# Patient Record
Sex: MALE
[De-identification: names, ages, dates, MRNs, and addresses within clinical notes are randomized per-mention and may not be internally consistent; named-entity substitution may affect disease eponyms.]

---

## 2022-05-17 ENCOUNTER — APPOINTMENT (OUTPATIENT)
Dept: ORTHOPEDIC SURGERY | Facility: CLINIC | Age: 67
End: 2022-05-17
Payer: MEDICARE

## 2022-05-17 VITALS — HEIGHT: 75 IN | WEIGHT: 240 LBS | BODY MASS INDEX: 29.84 KG/M2

## 2022-05-17 DIAGNOSIS — I10 ESSENTIAL (PRIMARY) HYPERTENSION: ICD-10-CM

## 2022-05-17 DIAGNOSIS — E11.9 TYPE 2 DIABETES MELLITUS W/OUT COMPLICATIONS: ICD-10-CM

## 2022-05-17 PROBLEM — Z00.00 ENCOUNTER FOR PREVENTIVE HEALTH EXAMINATION: Status: ACTIVE | Noted: 2022-05-17

## 2022-05-17 PROCEDURE — 73562 X-RAY EXAM OF KNEE 3: CPT | Mod: RT

## 2022-05-17 PROCEDURE — 99204 OFFICE O/P NEW MOD 45 MIN: CPT

## 2022-05-17 NOTE — DISCUSSION/SUMMARY
[de-identified] : The patient was advised of the diagnosis.  The natural history of the pathology was explained in full to the patient in layman's terms. All questions were answered.  The risks and benefits of surgical and non-surgical treatment alternatives were explained in full to the patient. \par Progress note completed by Meredith Ba PA-C.

## 2022-05-17 NOTE — HISTORY OF PRESENT ILLNESS
[8] : 8 [5] : 5 [Dull/Aching] : dull/aching [Localized] : localized [Intermittent] : intermittent [Sitting] : sitting [Stairs] : stairs [Full time] : Work status: full time [de-identified] : 5/17/22: 66yo M with right anterior knee pain for the past few months with no specific injury. Denies prior knee issues. States it initially bothered him when he was working from home at his desk. It became most painful when sitting for prolonged periods and it had improved with walking. Over the past few weeks it has become bothersome with descending stairs.  [] : Post Surgical Visit: no [FreeTextEntry1] : Right knee [FreeTextEntry5] : patient is here with knee pain for about a few months \par patient complains of aching pain after sitting for long periods of time

## 2022-05-17 NOTE — IMAGING
[Right] : right knee [There are no fractures, subluxations or dislocations. No significant abnormalities are seen] : There are no fractures, subluxations or dislocations. No significant abnormalities are seen [de-identified] : Right knee\par Minimal anterior tenderness\par ROM 0-125\par Ambulation w/o assistance \par

## 2022-05-17 NOTE — ASSESSMENT
[FreeTextEntry1] : 5/17/22: Minimal knee OA. No significant findings on exam. Unsure if chondromalacia patellae vs. quad tendonitis. Discussed anti-inflammatories, PT/HEP, CSI, and visco series. Mobic and PT rx. f/up in 6-8 weeks; consider MRI

## 2022-07-12 ENCOUNTER — APPOINTMENT (OUTPATIENT)
Dept: ORTHOPEDIC SURGERY | Facility: CLINIC | Age: 67
End: 2022-07-12

## 2022-07-12 VITALS — BODY MASS INDEX: 29.84 KG/M2 | WEIGHT: 240 LBS | HEIGHT: 75 IN

## 2022-07-12 DIAGNOSIS — M94.261 CHONDROMALACIA, RIGHT KNEE: ICD-10-CM

## 2022-07-12 DIAGNOSIS — M25.561 PAIN IN RIGHT KNEE: ICD-10-CM

## 2022-07-12 PROCEDURE — 99213 OFFICE O/P EST LOW 20 MIN: CPT

## 2022-07-12 NOTE — IMAGING
[Right] : right knee [There are no fractures, subluxations or dislocations. No significant abnormalities are seen] : There are no fractures, subluxations or dislocations. No significant abnormalities are seen [de-identified] : Right knee\par Minimal anterior tenderness\par ROM 0-125\par Ambulation w/o assistance \par

## 2022-07-12 NOTE — ASSESSMENT
[FreeTextEntry1] : Previous doc:\par 5/17/22: Minimal knee OA. No significant findings on exam. Unsure if chondromalacia patellae vs. quad tendonitis. Discussed anti-inflammatories, PT/HEP, CSI, and visco series. Mobic and PT rx. f/up in 6-8 weeks; consider MRI\par \par 7/12/22: Some improvement with PT - will cont this for now.  Declined MRI today.

## 2022-07-12 NOTE — HISTORY OF PRESENT ILLNESS
[7] : 7 [4] : 4 [de-identified] : 7/12/22: PT and mobic has been helping.\par \par Previous doc:\par 5/17/22: 68yo M with right anterior knee pain for the past few months with no specific injury. Denies prior knee issues. States it initially bothered him when he was working from home at his desk. It became most painful when sitting for prolonged periods and it had improved with walking. Over the past few weeks it has become bothersome with descending stairs.  [FreeTextEntry5] : fu \par pt states pain is not as frequent anymore, pt states pain worsens with sitting down at his desk\par Pt states he has been going to physocal therapy

## 2022-08-16 ENCOUNTER — RX RENEWAL (OUTPATIENT)
Age: 67
End: 2022-08-16

## 2024-03-29 ENCOUNTER — APPOINTMENT (OUTPATIENT)
Dept: ORTHOPEDIC SURGERY | Facility: CLINIC | Age: 69
End: 2024-03-29
Payer: MEDICARE

## 2024-03-29 ENCOUNTER — RESULT CHARGE (OUTPATIENT)
Age: 69
End: 2024-03-29

## 2024-03-29 VITALS — HEIGHT: 76 IN | WEIGHT: 250 LBS | BODY MASS INDEX: 30.44 KG/M2

## 2024-03-29 DIAGNOSIS — M47.816 SPONDYLOSIS W/OUT MYELOPATHY OR RADICULOPATHY, LUMBAR REGION: ICD-10-CM

## 2024-03-29 PROCEDURE — 99214 OFFICE O/P EST MOD 30 MIN: CPT

## 2024-03-29 PROCEDURE — 72100 X-RAY EXAM L-S SPINE 2/3 VWS: CPT

## 2024-03-29 PROCEDURE — 72170 X-RAY EXAM OF PELVIS: CPT

## 2024-03-29 RX ORDER — MELOXICAM 15 MG/1
15 TABLET ORAL DAILY
Qty: 30 | Refills: 1 | Status: ACTIVE | COMMUNITY
Start: 2024-03-29 | End: 1900-01-01

## 2024-03-29 RX ORDER — LISINOPRIL 30 MG/1
TABLET ORAL
Refills: 0 | Status: ACTIVE | COMMUNITY

## 2024-03-29 RX ORDER — CYCLOBENZAPRINE HYDROCHLORIDE 10 MG/1
10 TABLET, FILM COATED ORAL EVERY 8 HOURS
Qty: 60 | Refills: 0 | Status: ACTIVE | COMMUNITY
Start: 2024-03-29 | End: 1900-01-01

## 2024-03-29 RX ORDER — METFORMIN HYDROCHLORIDE 625 MG/1
TABLET ORAL
Refills: 0 | Status: ACTIVE | COMMUNITY

## 2024-03-29 RX ORDER — MELOXICAM 15 MG/1
15 TABLET ORAL
Qty: 30 | Refills: 0 | Status: DISCONTINUED | COMMUNITY
Start: 2022-05-17 | End: 2024-03-29

## 2024-03-29 NOTE — DISCUSSION/SUMMARY
[de-identified] : The patient was advised of the diagnosis.  The natural history of the pathology was explained in full to the patient in layman's terms. All questions were answered.  The risks and benefits of surgical and non-surgical treatment alternatives were explained in full to the patient.

## 2024-03-29 NOTE — ASSESSMENT
[FreeTextEntry1] : No hip findings.  Extensive Lspine degen changes - mobic, flexeril, PT, MRI if no improvement.

## 2024-03-29 NOTE — IMAGING
[Straightening consistent with spasm] : Straightening consistent with spasm [AP] : anteroposterior [Disc space narrowing] : Disc space narrowing [There are no fractures, subluxations or dislocations. No significant abnormalities are seen] : There are no fractures, subluxations or dislocations. No significant abnormalities are seen [de-identified] : Lspine: Right low back tenderness.  No swelling.  Tight hamstrings with SLR.  NVI.  Walks without assistance.  RIght hip: no swelling, no tenderness, no pain with ROM. [FreeTextEntry1] : bridging osteophytes.

## 2024-03-29 NOTE — REASON FOR VISIT
[facility]  coding opportunities          Chart Reviewed number of suggestions sent to Provider: 1   [id number] [phone]    Patients Insurance        Commercial Insurance: [employer] [FreeTextEntry2] : New injury: right hip

## 2024-03-29 NOTE — HISTORY OF PRESENT ILLNESS
[de-identified] : Right low back pain x 2 weeks, started 2 days after trying to move heavy object.  Lately has been radiating to thigh.  Groin pain over the past day.  No prior back issues.  Ice and advil helps a little.  No numbness/tingling. [FreeTextEntry5] : Patient is here for right hip pain that began 1 week ago, not due to injury. Denies n/t. Pain radiates into quad/groin. Pain located in lumbar. Worsens with walking/standing. No prior injury. No formal treatment to date. Tried Advil.

## 2024-04-03 RX ORDER — TRAMADOL HYDROCHLORIDE 50 MG/1
50 TABLET, COATED ORAL
Qty: 30 | Refills: 0 | Status: ACTIVE | COMMUNITY
Start: 2024-04-03 | End: 1900-01-01

## 2024-04-04 ENCOUNTER — APPOINTMENT (OUTPATIENT)
Dept: MRI IMAGING | Facility: CLINIC | Age: 69
End: 2024-04-04
Payer: MEDICARE

## 2024-04-04 PROCEDURE — 72148 MRI LUMBAR SPINE W/O DYE: CPT | Mod: MH

## 2024-04-11 RX ORDER — OXYCODONE AND ACETAMINOPHEN 5; 325 MG/1; MG/1
5-325 TABLET ORAL
Qty: 12 | Refills: 0 | Status: ACTIVE | COMMUNITY
Start: 2024-04-04 | End: 1900-01-01

## 2024-04-12 ENCOUNTER — APPOINTMENT (OUTPATIENT)
Dept: PAIN MANAGEMENT | Facility: CLINIC | Age: 69
End: 2024-04-12

## 2024-11-12 ENCOUNTER — RESULT CHARGE (OUTPATIENT)
Age: 69
End: 2024-11-12

## 2024-11-12 ENCOUNTER — APPOINTMENT (OUTPATIENT)
Dept: ORTHOPEDIC SURGERY | Facility: CLINIC | Age: 69
End: 2024-11-12
Payer: MEDICARE

## 2024-11-12 VITALS — HEIGHT: 75 IN | BODY MASS INDEX: 31.08 KG/M2 | WEIGHT: 250 LBS

## 2024-11-12 DIAGNOSIS — M25.561 PAIN IN RIGHT KNEE: ICD-10-CM

## 2024-11-12 DIAGNOSIS — M94.261 CHONDROMALACIA, RIGHT KNEE: ICD-10-CM

## 2024-11-12 DIAGNOSIS — M47.816 SPONDYLOSIS W/OUT MYELOPATHY OR RADICULOPATHY, LUMBAR REGION: ICD-10-CM

## 2024-11-12 PROCEDURE — 20610 DRAIN/INJ JOINT/BURSA W/O US: CPT | Mod: RT

## 2024-11-12 PROCEDURE — 99213 OFFICE O/P EST LOW 20 MIN: CPT | Mod: 25

## 2024-11-12 PROCEDURE — J3490M: CUSTOM | Mod: NC,JZ

## 2024-11-12 PROCEDURE — 73562 X-RAY EXAM OF KNEE 3: CPT | Mod: RT

## 2024-12-17 ENCOUNTER — APPOINTMENT (OUTPATIENT)
Dept: ORTHOPEDIC SURGERY | Facility: CLINIC | Age: 69
End: 2024-12-17